# Patient Record
Sex: MALE | Race: WHITE | Employment: OTHER | URBAN - METROPOLITAN AREA
[De-identification: names, ages, dates, MRNs, and addresses within clinical notes are randomized per-mention and may not be internally consistent; named-entity substitution may affect disease eponyms.]

---

## 2017-06-06 ENCOUNTER — NURSE ONLY (OUTPATIENT)
Dept: FAMILY MEDICINE CLINIC | Facility: CLINIC | Age: 77
End: 2017-06-06

## 2017-06-06 VITALS
SYSTOLIC BLOOD PRESSURE: 160 MMHG | OXYGEN SATURATION: 98 % | DIASTOLIC BLOOD PRESSURE: 94 MMHG | HEART RATE: 86 BPM | BODY MASS INDEX: 22.71 KG/M2 | WEIGHT: 133 LBS | TEMPERATURE: 98 F | RESPIRATION RATE: 16 BRPM | HEIGHT: 64 IN

## 2017-06-06 DIAGNOSIS — K08.89 TOOTH PAIN: Primary | ICD-10-CM

## 2017-06-06 DIAGNOSIS — R03.0 ELEVATED BLOOD PRESSURE READING WITHOUT DIAGNOSIS OF HYPERTENSION: ICD-10-CM

## 2017-06-06 PROCEDURE — 99202 OFFICE O/P NEW SF 15 MIN: CPT | Performed by: NURSE PRACTITIONER

## 2017-06-06 RX ORDER — INSULIN LISPRO 100 [IU]/ML
INJECTION, SOLUTION INTRAVENOUS; SUBCUTANEOUS
Refills: 3 | COMMUNITY
Start: 2017-05-22

## 2017-06-06 RX ORDER — AMOXICILLIN AND CLAVULANATE POTASSIUM 875; 125 MG/1; MG/1
1 TABLET, FILM COATED ORAL 2 TIMES DAILY
Qty: 14 TABLET | Refills: 0 | Status: SHIPPED | OUTPATIENT
Start: 2017-06-06 | End: 2017-06-13

## 2017-06-07 NOTE — PROGRESS NOTES
CHIEF COMPLAINT:   Patient presents with:  Dental Problem (dental)        HPI:   Sahil Hyman is a 68year old male who presents with dental pain since last night or rt upper molar. Pain is described as intense aching.  Home treatments include: ibuprofe /94 mmHg  Pulse 86  Temp(Src) 97.9 °F (36.6 °C) (Oral)  Resp 16  Ht 64\"  Wt 133 lb  BMI 22.82 kg/m2  SpO2 98%  GENERAL: well developed, well nourished, well hydrated, in no apparent distress  SKIN: no rashes, no suspicious lesions, good skin turgor A crack or cavity in a tooth can cause tooth pain. This is because the crack or cavity exposes the sensitive inner area of the tooth. An infection in the gum or the root of the tooth can cause pain and swelling.  The pain is often made worse when you drink Call your health care provider right away if any of these occur:  · Your face becomes swollen or red  · Pain gets worse or spreads to your neck  · Fever of 100.4º F (38.0º C) or higher, or as directed by your healthcare provider  · Pus drains from the toot

## 2017-06-07 NOTE — PATIENT INSTRUCTIONS
Follow up with your doctor for a blood pressure check in 1 week  Call your dentist for an appointment as soon as possible  Dental Pain    A crack or cavity in a tooth can cause tooth pain.  This is because the crack or cavity exposes the sensitive inner a · Headache or stiff neck  · Weakness or fainting  · Difficulty swallowing or breathing  When to seek medical advice  Call your health care provider right away if any of these occur:  · Your face becomes swollen or red  · Pain gets worse or spreads to your

## (undated) NOTE — MR AVS SNAPSHOT
[facility]  [address] [id number] [phone]               Thank you for choosing us for your health care visit with AUDELIA Paez. We are glad to serve you and happy to provide you with this summary of your visit.   Pavan of cloves at drugstores. Some pharmacies carry an over-the-counter \"toothache kit. \" This contains a paste that you can put on the exposed tooth to make it less sensitive. · Put a cold pack on your jaw over the sore area to help reduce pain.   · You may u Amoxicillin-Pot Clavulanate 875-125 MG Tabs   Take 1 tablet by mouth 2 (two) times daily. Commonly known as:  AUGMENTIN           HUMALOG KWIKPEN 100 UNIT/ML Sopn   Generic drug:  Insulin Lispro   Inject into the skin.  Inject 2 to 6 units three times da Weight Reduction Maintain normal body weight (body mass index 18.5-24.9 kg/m2)   DASH eating plan Adopt a diet rich in fruits, vegetables, and low fat dairy products with reduced content of saturated and total fat.    Dietary sodium reduction Reduce dietary